# Patient Record
Sex: MALE | Race: WHITE
[De-identification: names, ages, dates, MRNs, and addresses within clinical notes are randomized per-mention and may not be internally consistent; named-entity substitution may affect disease eponyms.]

---

## 2019-10-09 ENCOUNTER — HOSPITAL ENCOUNTER (OUTPATIENT)
Dept: HOSPITAL 92 - BICRAD | Age: 34
Discharge: HOME | End: 2019-10-09
Attending: FAMILY MEDICINE
Payer: COMMERCIAL

## 2019-10-09 DIAGNOSIS — M79.672: Primary | ICD-10-CM
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
## 2019-10-11 ENCOUNTER — HOSPITAL ENCOUNTER (INPATIENT)
Dept: HOSPITAL 92 - ERS | Age: 34
LOS: 12 days | Discharge: HOME | DRG: 854 | End: 2019-10-23
Attending: INTERNAL MEDICINE | Admitting: INTERNAL MEDICINE
Payer: COMMERCIAL

## 2019-10-11 VITALS — BODY MASS INDEX: 34.7 KG/M2

## 2019-10-11 DIAGNOSIS — L03.116: ICD-10-CM

## 2019-10-11 DIAGNOSIS — R03.0: ICD-10-CM

## 2019-10-11 DIAGNOSIS — B95.62: ICD-10-CM

## 2019-10-11 DIAGNOSIS — M71.062: ICD-10-CM

## 2019-10-11 DIAGNOSIS — E66.9: ICD-10-CM

## 2019-10-11 DIAGNOSIS — E87.1: ICD-10-CM

## 2019-10-11 DIAGNOSIS — Z79.899: ICD-10-CM

## 2019-10-11 DIAGNOSIS — M72.9: ICD-10-CM

## 2019-10-11 DIAGNOSIS — F41.9: ICD-10-CM

## 2019-10-11 DIAGNOSIS — A41.02: Primary | ICD-10-CM

## 2019-10-11 LAB
ALBUMIN SERPL BCG-MCNC: 3.7 G/DL (ref 3.5–5)
ALP SERPL-CCNC: 91 U/L (ref 40–110)
ALT SERPL W P-5'-P-CCNC: 13 U/L (ref 8–55)
ANION GAP SERPL CALC-SCNC: 12 MMOL/L (ref 10–20)
AST SERPL-CCNC: 10 U/L (ref 5–34)
BILIRUB SERPL-MCNC: 1.9 MG/DL (ref 0.2–1.2)
BUN SERPL-MCNC: 14 MG/DL (ref 8.9–20.6)
CALCIUM SERPL-MCNC: 9.2 MG/DL (ref 7.8–10.44)
CHLORIDE SERPL-SCNC: 98 MMOL/L (ref 98–107)
CO2 SERPL-SCNC: 26 MMOL/L (ref 22–29)
CREAT CL PREDICTED SERPL C-G-VRATE: 0 ML/MIN (ref 70–130)
GLOBULIN SER CALC-MCNC: 3.3 G/DL (ref 2.4–3.5)
GLUCOSE SERPL-MCNC: 115 MG/DL (ref 70–105)
HGB BLD-MCNC: 15.8 G/DL (ref 14–18)
MCH RBC QN AUTO: 29.1 PG (ref 27–31)
MCV RBC AUTO: 87.4 FL (ref 78–98)
MDIFF COMPLETE?: YES
PLATELET # BLD AUTO: 209 THOU/UL (ref 130–400)
POTASSIUM SERPL-SCNC: 3.8 MMOL/L (ref 3.5–5.1)
RBC # BLD AUTO: 5.42 MILL/UL (ref 4.7–6.1)
SODIUM SERPL-SCNC: 132 MMOL/L (ref 136–145)
WBC # BLD AUTO: 24.5 THOU/UL (ref 4.8–10.8)

## 2019-10-11 PROCEDURE — 96375 TX/PRO/DX INJ NEW DRUG ADDON: CPT

## 2019-10-11 PROCEDURE — 87070 CULTURE OTHR SPECIMN AEROBIC: CPT

## 2019-10-11 PROCEDURE — 82550 ASSAY OF CK (CPK): CPT

## 2019-10-11 PROCEDURE — 83605 ASSAY OF LACTIC ACID: CPT

## 2019-10-11 PROCEDURE — 80202 ASSAY OF VANCOMYCIN: CPT

## 2019-10-11 PROCEDURE — 96361 HYDRATE IV INFUSION ADD-ON: CPT

## 2019-10-11 PROCEDURE — 85025 COMPLETE CBC W/AUTO DIFF WBC: CPT

## 2019-10-11 PROCEDURE — 96365 THER/PROPH/DIAG IV INF INIT: CPT

## 2019-10-11 PROCEDURE — 90686 IIV4 VACC NO PRSV 0.5 ML IM: CPT

## 2019-10-11 PROCEDURE — G0008 ADMIN INFLUENZA VIRUS VAC: HCPCS

## 2019-10-11 PROCEDURE — 86140 C-REACTIVE PROTEIN: CPT

## 2019-10-11 PROCEDURE — 80048 BASIC METABOLIC PNL TOTAL CA: CPT

## 2019-10-11 PROCEDURE — 90471 IMMUNIZATION ADMIN: CPT

## 2019-10-11 PROCEDURE — 96367 TX/PROPH/DG ADDL SEQ IV INF: CPT

## 2019-10-11 PROCEDURE — 87205 SMEAR GRAM STAIN: CPT

## 2019-10-11 PROCEDURE — 36415 COLL VENOUS BLD VENIPUNCTURE: CPT

## 2019-10-11 PROCEDURE — 87186 SC STD MICRODIL/AGAR DIL: CPT

## 2019-10-11 PROCEDURE — 85652 RBC SED RATE AUTOMATED: CPT

## 2019-10-11 PROCEDURE — 87077 CULTURE AEROBIC IDENTIFY: CPT

## 2019-10-11 PROCEDURE — 80053 COMPREHEN METABOLIC PANEL: CPT

## 2019-10-11 PROCEDURE — 87040 BLOOD CULTURE FOR BACTERIA: CPT

## 2019-10-11 NOTE — CT
Exam:

Left lower extremity CT scan with IV contrast:



HISTORY:

Marked soft tissue swelling of the leg around the knee. Infection cellulitis



FINDINGS:

There is very extensive superficial subcutaneous fat stranding and extensive superficial subcutaneous
 fluid evidence for extensive edema and/or cellulitis. In addition there is also very marked

diffuse superficial fasciitis circumferentially involving the leg from the visualized mid thigh throu
gh the knee region and into the visualized upper calf regions of the lower leg. There are more

focal fluid collections in the superficial infrapatellar region this could represent focal cellulitis
 and possibly extensive superficial infrapatellar bursitis. No evidence for deep intramuscular

abscess or intramuscular abnormal gas or abnormal gas within the more superficial soft tissues. No si
gnificant osseous abnormality. No abnormal knee joint effusion.



IMPRESSION:

Very extensive superficial soft tissue swelling evidence for extensive cellulitis.

Very extensive superficial fasciitis involving the lower thigh, knee, and upper calf regions.

No evidence for deep intramuscular abscess or abnormal fluid collection.

No evidence for abnormal gas within the soft tissues. No significant bony abnormality. Superficial pr
epatellar edema or cellulitis and fluid density could represent extensive bursitis as well.



Reported By: Rafal Vazquez 

Electronically Signed:  10/11/2019 6:59 PM
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
## 2019-10-11 NOTE — RAD
Left knee 4 views



HISTORY: Left knee injury.



FINDINGS: Joint spaces are preserved. No acute fracture, dislocation, or fluid distention of the supr
apatellar bursa. Prominent soft tissue swelling over the anterior aspect of the knee and extending

to the anterior portion of the lower thigh. Internal low density has the appearance of mixture of sub
cutaneous fat and edema. No soft tissue gas is apparent.











IMPRESSION: Prominent soft tissue swelling. No acute osseous abnormalities are demonstrated.



Reported By: TONG Salazar 

Electronically Signed:  10/11/2019 5:33 PM

## 2019-10-12 LAB
ANION GAP SERPL CALC-SCNC: 10 MMOL/L (ref 10–20)
BUN SERPL-MCNC: 14 MG/DL (ref 8.9–20.6)
CALCIUM SERPL-MCNC: 8.3 MG/DL (ref 7.8–10.44)
CHLORIDE SERPL-SCNC: 102 MMOL/L (ref 98–107)
CO2 SERPL-SCNC: 23 MMOL/L (ref 22–29)
CREAT CL PREDICTED SERPL C-G-VRATE: 222 ML/MIN (ref 70–130)
GLUCOSE SERPL-MCNC: 122 MG/DL (ref 70–105)
HGB BLD-MCNC: 13.6 G/DL (ref 14–18)
MCH RBC QN AUTO: 29 PG (ref 27–31)
MCV RBC AUTO: 87.7 FL (ref 78–98)
MDIFF COMPLETE?: YES
PLATELET # BLD AUTO: 190 THOU/UL (ref 130–400)
POTASSIUM SERPL-SCNC: 3.9 MMOL/L (ref 3.5–5.1)
RBC # BLD AUTO: 4.68 MILL/UL (ref 4.7–6.1)
SODIUM SERPL-SCNC: 131 MMOL/L (ref 136–145)
VANCOMYCIN TROUGH SERPL-MCNC: 10.2 UG/ML
WBC # BLD AUTO: 18.8 THOU/UL (ref 4.8–10.8)

## 2019-10-12 RX ADMIN — Medication PRN ML: at 12:45

## 2019-10-12 RX ADMIN — HYDROCODONE BITARTRATE AND ACETAMINOPHEN PRN TAB: 5; 325 TABLET ORAL at 05:14

## 2019-10-12 RX ADMIN — HYDROCODONE BITARTRATE AND ACETAMINOPHEN PRN TAB: 7.5; 325 TABLET ORAL at 22:19

## 2019-10-12 RX ADMIN — HYDROCODONE BITARTRATE AND ACETAMINOPHEN PRN TAB: 5; 325 TABLET ORAL at 11:43

## 2019-10-12 RX ADMIN — ONDANSETRON PRN MG: 2 INJECTION INTRAMUSCULAR; INTRAVENOUS at 20:32

## 2019-10-12 RX ADMIN — Medication SCH: at 21:53

## 2019-10-12 RX ADMIN — ONDANSETRON PRN MG: 2 INJECTION INTRAMUSCULAR; INTRAVENOUS at 09:20

## 2019-10-12 RX ADMIN — HYDROCODONE BITARTRATE AND ACETAMINOPHEN PRN TAB: 7.5; 325 TABLET ORAL at 17:30

## 2019-10-12 NOTE — PDOC.HOSPP
- Subjective


Encounter Date: 10/12/19


Encounter Time: 11:30


Subjective: 





pt up in bed complains of pain to his left knee. 





- Objective


Vital Signs & Weight: 


 Vital Signs (12 hours)











  Temp Pulse Resp BP Pulse Ox


 


 10/12/19 16:00  99.8 F H  108 H  20  110/68  95


 


 10/12/19 11:36  97.8 F  97  18  110/76  98


 


 10/12/19 09:25      96








 Weight











Admit Weight                   270 lb 5.92 oz


 


Weight                         270 lb 5.92 oz














I&O: 


 











 10/11/19 10/12/19 10/13/19





 06:59 06:59 06:59


 


Intake Total  1850 2290


 


Output Total   1325


 


Balance  1850 965











Result Diagrams: 


 10/12/19 06:14





 10/12/19 06:14





Hospitalist ROS





- Review of Systems


Respiratory: denies: cough, dry, shortness of breath, hemoptysis, SOB with 

excertion, pleuritic pain, sputum, wheezing, other


Cardiovascular: denies: chest pain, palpitations, orthopnea, paroxysmal noc. 

dyspnea, edema, light headedness, other


Musculoskeletal: reports: leg pain





- Medication


Medications: 


Active Medications











Generic Name Dose Route Start Last Admin





  Trade Name Freq  PRN Reason Stop Dose Admin


 


Hydrocodone Bitart/Acetaminophen  2 tab  10/12/19 12:15  10/12/19 17:30





  Norco 7.5/325  PO   2 tab





  Q4H PRN   Administration





  Moderate Pain (4-6)   





     





     





     


 


Enoxaparin Sodium  40 mg  10/12/19 09:00  10/12/19 09:16





  Lovenox  SC   40 mg





  0900 TAWNYA   Administration





     





     





     





     


 


Famotidine  20 mg  10/11/19 21:00  10/12/19 09:15





  Pepcid  PO   20 mg





  BID TAWNYA   Administration





     





     





     





     


 


Ibuprofen  400 mg  10/11/19 20:25  10/12/19 05:15





  Motrin  PO   400 mg





  Q4H PRN   Administration





  Fever > 101   





     





     





     


 


Morphine Sulfate  4 mg  10/12/19 12:15  10/12/19 19:03





  Morphine  SLOW IVP   4 mg





  Q4H PRN   Administration





  Mild-Moderate Pain (1-5)   





     





     





     


 


Ondansetron HCl  4 mg  10/11/19 20:25  10/12/19 09:20





  Zofran  IVP   4 mg





  Q6H PRN   Administration





  Nausea/Vomiting   





     





     





     


 


Saccharomyces Boulardii  250 mg  10/12/19 09:00  10/12/19 09:15





  Florastor  PO   250 mg





  DAILY TAWNYA   Administration





     





     





     





     


 


Sodium Chloride  10 ml  10/12/19 12:18  10/12/19 12:45





  Flush - Normal Saline  IVF   10 ml





  PRN PRN   Administration





  Saline Flush   





     





     





     














- Exam


Neck: negative: supple, symmetric, no JVD, no thyromegaly, no lymphadenopathy, 

no carotid bruit, JVD


Heart: negative: RRR, no murmur, no gallops, no rubs, normal peripheral pulses, 

irregular, diminshed peripheral pulses, murmur present, II/IV, III/IV


Respiratory: negative: CTAB, no wheezes, no rales, no ronchi, normal chest 

expansion, no tachypnea, normal percussion, rales, rhonchi, tachypneic, wheezes





Hosp A/P


(1) Cellulitis


Code(s): L03.90 - CELLULITIS, UNSPECIFIED   Status: Acute   





(2) Fasciitis


Code(s): M72.9 - FIBROBLASTIC DISORDER, UNSPECIFIED   Status: Acute   





- Plan





will consult ID, will continue broad spectrum abx for now. may need surgical 

debridement.
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
## 2019-10-13 LAB
HGB BLD-MCNC: 12.4 G/DL (ref 14–18)
MCH RBC QN AUTO: 29.6 PG (ref 27–31)
MCV RBC AUTO: 88.2 FL (ref 78–98)
MDIFF COMPLETE?: YES
PLATELET # BLD AUTO: 214 THOU/UL (ref 130–400)
RBC # BLD AUTO: 4.19 MILL/UL (ref 4.7–6.1)
VANCOMYCIN TROUGH SERPL-MCNC: 13.4 UG/ML
WBC # BLD AUTO: 16.1 THOU/UL (ref 4.8–10.8)

## 2019-10-13 PROCEDURE — 0M9P0ZZ DRAINAGE OF LEFT KNEE BURSA AND LIGAMENT, OPEN APPROACH: ICD-10-PCS | Performed by: ORTHOPAEDIC SURGERY

## 2019-10-13 PROCEDURE — 0MBP0ZZ EXCISION OF LEFT KNEE BURSA AND LIGAMENT, OPEN APPROACH: ICD-10-PCS | Performed by: ORTHOPAEDIC SURGERY

## 2019-10-13 RX ADMIN — HYDROCODONE BITARTRATE AND ACETAMINOPHEN PRN TAB: 10; 325 TABLET ORAL at 20:33

## 2019-10-13 RX ADMIN — HYDROCODONE BITARTRATE AND ACETAMINOPHEN PRN TAB: 7.5; 325 TABLET ORAL at 12:19

## 2019-10-13 RX ADMIN — HYDROCODONE BITARTRATE AND ACETAMINOPHEN PRN TAB: 7.5; 325 TABLET ORAL at 04:06

## 2019-10-13 RX ADMIN — Medication SCH ML: at 20:35

## 2019-10-13 RX ADMIN — Medication SCH: at 12:11

## 2019-10-13 NOTE — PDOC.HOSPP
- Subjective


Encounter Date: 10/13/19


Encounter Time: 11:45


Subjective: 





pt feels his left leg erythema is worsening. however overall he feels better. 





- Objective


Vital Signs & Weight: 


 Vital Signs (12 hours)











  Temp Pulse Resp BP Pulse Ox


 


 10/13/19 08:31  99.5 F  92  16  120/89  97








 Weight











Admit Weight                   270 lb 5.92 oz


 


Weight                         270 lb 5.92 oz














I&O: 


 











 10/12/19 10/13/19 10/14/19





 06:59 06:59 06:59


 


Intake Total 1850 4030 


 


Output Total  2900 


 


Balance 1850 1130 











Result Diagrams: 


 10/12/19 06:14





 10/12/19 06:14





Hospitalist ROS





- Review of Systems


Cardiovascular: denies: chest pain, palpitations, orthopnea, paroxysmal noc. 

dyspnea, edema, light headedness, other


Musculoskeletal: reports: other (erythema to left groin and edema)





- Medication


Medications: 


Active Medications











Generic Name Dose Route Start Last Admin





  Trade Name Freq  PRN Reason Stop Dose Admin


 


Hydrocodone Bitart/Acetaminophen  2 tab  10/12/19 12:15  10/13/19 12:19





  Norco 7.5/325  PO   2 tab





  Q4H PRN   Administration





  Moderate Pain (4-6)   





     





     





     


 


Enoxaparin Sodium  40 mg  10/12/19 09:00  10/13/19 09:18





  Lovenox  SC   40 mg





  0900 TAWNYA   Administration





     





     





     





     


 


Famotidine  20 mg  10/11/19 21:00  10/13/19 09:24





  Pepcid  PO   20 mg





  BID TAWNYA   Administration





     





     





     





     


 


Vancomycin HCl 2 gm/ Sodium  500 mls @ 250 mls/hr  10/12/19 20:00  10/13/19 04:

08





  Chloride  IVPB   500 mls





  0400,1200,2000 TAWNYA   Administration





     





     





     





     


 


Ibuprofen  400 mg  10/11/19 20:25  10/12/19 20:27





  Motrin  PO   400 mg





  Q4H PRN   Administration





  Fever > 101   





     





     





     


 


Morphine Sulfate  4 mg  10/12/19 12:15  10/12/19 19:03





  Morphine  SLOW IVP   4 mg





  Q4H PRN   Administration





  Mild-Moderate Pain (1-5)   





     





     





     


 


Ondansetron HCl  4 mg  10/11/19 20:25  10/12/19 20:32





  Zofran  IVP   4 mg





  Q6H PRN   Administration





  Nausea/Vomiting   





     





     





     


 


Saccharomyces Boulardii  250 mg  10/12/19 09:00  10/13/19 09:18





  Florastor  PO   250 mg





  DAILY TAWNYA   Administration





     





     





     





     


 


Sertraline HCl  50 mg  10/13/19 09:00  10/13/19 09:19





  Zoloft  PO   50 mg





  DAILY TAWNYA   Administration





     





     





     





     


 


Sodium Chloride  10 ml  10/12/19 21:00  10/13/19 12:11





  Flush - Normal Saline  IVF   Not Given





  Q12HR TAWNYA   





     





     





     





     


 


Sodium Chloride  10 ml  10/12/19 12:18  10/12/19 12:45





  Flush - Normal Saline  IVF   10 ml





  PRN PRN   Administration





  Saline Flush   





     





     





     














- Exam


Neck: negative: supple, symmetric, no JVD, no thyromegaly, no lymphadenopathy, 

no carotid bruit, JVD


Heart: negative: RRR, no murmur, no gallops, no rubs, normal peripheral pulses, 

irregular, diminshed peripheral pulses, murmur present, II/IV, III/IV


Respiratory: negative: CTAB, no wheezes, no rales, no ronchi, normal chest 

expansion, no tachypnea, normal percussion, rales, rhonchi, tachypneic, wheezes


Extremities: 1+ LE edema


Extremities - other findings: mild eryhtema to his left groin





Hosp A/P


(1) Cellulitis


Code(s): L03.90 - CELLULITIS, UNSPECIFIED   Status: Acute   





(2) Fasciitis


Code(s): M72.9 - FIBROBLASTIC DISORDER, UNSPECIFIED   Status: Acute   





- Plan





will consult ID, will continue broad spectrum abx for now. may need surgical 

debridement.





10/13 pt does have significant edema to his left thigh but his erythema has 

improved. I did advise him to elevate his left leg higher. He is on broad 

spectrum abx. will consider testing if his left leg swelling and erythema does 

not improve.

## 2019-10-13 NOTE — PRG
DATE OF SERVICE:  10/13/2019



SUBJECTIVE:  Still with quite a bit of pain, and redness is worsened in the

prepatellar region.  Had some difficulty urination yesterday.  No respiratory

symptoms or abdominal pain. 



OBJECTIVE:  VITAL SIGNS:  T-max 99.8.  Other vital signs are normal. 

GENERAL:  Awake, alert, oriented. 

LUNGS:  Clear. 

HEART:  S1 and S2.  Regular rate. 

EXTREMITIES:  Left leg with still persistent erythema in the prepatellar region.

Swelling. 



LABORATORY DATA:  White cell count is at 18.8, hemoglobin 13.6, platelets 190.

Creatinine 0.83.  Microbiology with MRSA. 



ASSESSMENT AND DISCUSSION:  Abscess and cellulitis at the left prepatellar area,

likely prepatellar bursitis.  We will consult Surgery. 







Job ID:  [id number]
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
## 2019-10-14 LAB
ANION GAP SERPL CALC-SCNC: 9 MMOL/L (ref 10–20)
BASOPHILS # BLD AUTO: 0 THOU/UL (ref 0–0.2)
BASOPHILS NFR BLD AUTO: 0.1 % (ref 0–1)
BUN SERPL-MCNC: 14 MG/DL (ref 8.9–20.6)
CALCIUM SERPL-MCNC: 8.5 MG/DL (ref 7.8–10.44)
CHLORIDE SERPL-SCNC: 102 MMOL/L (ref 98–107)
CO2 SERPL-SCNC: 28 MMOL/L (ref 22–29)
CREAT CL PREDICTED SERPL C-G-VRATE: 252 ML/MIN (ref 70–130)
EOSINOPHIL # BLD AUTO: 0 THOU/UL (ref 0–0.7)
EOSINOPHIL NFR BLD AUTO: 0.1 % (ref 0–10)
GLUCOSE SERPL-MCNC: 135 MG/DL (ref 70–105)
HGB BLD-MCNC: 11.9 G/DL (ref 14–18)
LYMPHOCYTES # BLD: 0.6 THOU/UL (ref 1.2–3.4)
LYMPHOCYTES NFR BLD AUTO: 3.3 % (ref 21–51)
MCH RBC QN AUTO: 29.4 PG (ref 27–31)
MCV RBC AUTO: 88.5 FL (ref 78–98)
MONOCYTES # BLD AUTO: 0.9 THOU/UL (ref 0.11–0.59)
MONOCYTES NFR BLD AUTO: 4.8 % (ref 0–10)
NEUTROPHILS # BLD AUTO: 16.3 THOU/UL (ref 1.4–6.5)
NEUTROPHILS NFR BLD AUTO: 91.7 % (ref 42–75)
PLATELET # BLD AUTO: 247 THOU/UL (ref 130–400)
POTASSIUM SERPL-SCNC: 3.7 MMOL/L (ref 3.5–5.1)
RBC # BLD AUTO: 4.05 MILL/UL (ref 4.7–6.1)
SODIUM SERPL-SCNC: 135 MMOL/L (ref 136–145)
VANCOMYCIN TROUGH SERPL-MCNC: 13.4 UG/ML
WBC # BLD AUTO: 17.7 THOU/UL (ref 4.8–10.8)

## 2019-10-14 RX ADMIN — HYDROCODONE BITARTRATE AND ACETAMINOPHEN PRN TAB: 10; 325 TABLET ORAL at 09:46

## 2019-10-14 RX ADMIN — DOCUSATE SODIUM 50 MG AND SENNOSIDES 8.6 MG SCH TAB: 8.6; 5 TABLET, FILM COATED ORAL at 21:42

## 2019-10-14 RX ADMIN — Medication SCH: at 21:42

## 2019-10-14 RX ADMIN — Medication SCH ML: at 09:50

## 2019-10-14 NOTE — PRG
DATE OF SERVICE:  10/14/2019



SUBJECTIVE:  The patient had I and D by Dr. Schneider.  200 mL of purulent exudate

retrieved from the site.  The patient has a negative pressure dressing at this time.

 He already feels improvement in the pain and swelling.  He denies any headaches.

No shortness of breath or chest pain.  No abdominal pain or diarrhea.  No

genitourinary symptoms.  No joint symptoms outside the area of involvement. 



OBJECTIVE:  VITAL SIGNS:  Normal. 

GENERAL:  The patient has negative pressure dressing in the left prepatellar region.

 The erythema has receded already. 

LUNGS:  Clear. 

HEART:  S1, S2, regular rate. 

ABDOMEN:  Soft, not distended, nontender.  No ascites.  No bladder distention. 

NEUROLOGIC:  Nonfocal.



LABORATORY DATA:  White cell count is 17.7, hemoglobin 11, platelets 247.  Sodium

135, creatinine 0.73.  Microbiology with MRSA and patient is on vancomycin. 



ASSESSMENT:  Abscess, prepatellar region, status post debridement by Dr. Schneider, has

negative pressure dressing and we will wait on final progress of the lesion before

we decide on IV versus oral antimicrobial therapy for discharge planning.  Since he

has not been documented to have been bacteremic, will be eligible for oral

antimicrobial therapy for discharge planning depending on progress. 







Job ID:  [id number]

## 2019-10-14 NOTE — PRG
DATE OF SERVICE:  10/14/2019



SUBJECTIVE:  Edwin is a 33-year-old male, who is postop day #1 from a left knee

septic prepatellar bursitis.  Apparently, he has MRSA and Dr. Schneider removed

approximately 100 mL of the patient's prepatellar bursa.  He feels a little better,

but he feels as though the erythema has advanced slightly. 



OBJECTIVE:  VITAL SIGNS:  Temperature 98.4, pulse 92, respiratory rate 16 and

nonlabored, O2 saturation 97% on room air, and blood pressure 129/72. 

GENERAL:  He is alert, appropriate, responsive with examiner. 

EXTREMITIES:  Visual inspection of the left lower extremity demonstrates him to have

erythema extending up on the medial mid thigh up towards the groin and also dorsal

mid thigh and blanches with pressure.  He has some swelling distal to his wound, but

there is no strike through.  Wound Care is placed a VAC over the bursa. 



IMPRESSION:  

1. Left knee methicillin-resistant Staphylococcus aureus septic prepatellar

bursitis, status post incision and drainage just over 12 hours. 

2. Persistent cellulitis, left thigh.



PLAN:  

1. We will add some topical lidocaine to the orders for wound care.

2. Demarcation of the erythema, so we can re-evaluate in 24 hours.

3. Continue IV antibiotics per Dr. Smith and re-evaluate tomorrow morning.  Give

consideration to reexploration if the erythema is not resolving or responding to

antibiotics. 







Job ID:  [id number]

## 2019-10-14 NOTE — OP
DATE OF PROCEDURE:  10/13/2019



PREOPERATIVE DIAGNOSIS:  Prepatellar bursitis, septic, left knee.



POSTOPERATIVE DIAGNOSIS:  Prepatellar bursitis, septic, left knee.



PROCEDURE PERFORMED:  Irrigation and debridement of left septic prepatellar bursa.



ANESTHESIA:  General.



SPECIMENS:  Culture.



DRAINS:  None.



COMPLICATIONS:  None.



BLOOD LOSS:  About 20.



DESCRIPTION OF PROCEDURE:  The patient was taken to the operating room, where

general anesthesia was induced.  The left leg was prepped and draped in usual

sterile fashion.  He was already on scheduled vancomycin preoperatively.  I made a

longitudinal incision through the abrasions and poor skin on the anterior aspect of

the knee.  This was an obvious site of entry for the infection.  A geyser of pus

erupted, probably about 200 mL of very thick pus.  It appeared to be probably an

infected hematoma of the bursa.  I then probed the bursa and broke up loculations,

debrided the tissue and excised the bursal tissue, and used pulsatile lavage

irrigation to further clean the bursa.  The bursa was then packed with a Kerlix

roll. 



POSTOPERATIVE PLAN:  He is to continue vancomycin, pending cultures and a wound VAC

will be applied by Wound Care tomorrow. 







Job ID:  [id number]

## 2019-10-15 RX ADMIN — Medication SCH: at 08:37

## 2019-10-15 RX ADMIN — DOCUSATE SODIUM 50 MG AND SENNOSIDES 8.6 MG SCH TAB: 8.6; 5 TABLET, FILM COATED ORAL at 08:26

## 2019-10-15 RX ADMIN — Medication SCH: at 20:26

## 2019-10-15 NOTE — PDOC.HOSPP
- Subjective


Encounter Date: 10/15/19


Encounter Time: 12:45


Subjective: 





Patient seen and examined for Sepsis. Pain controlled. Had BM. No new 

complaints. No overnight events





- Objective


Vital Signs & Weight: 


 Vital Signs (12 hours)











  Temp Pulse Resp BP Pulse Ox


 


 10/15/19 12:00  97.8 F  88  18  137/87  99


 


 10/15/19 08:00  98.2 F  96  18  142/84 H  99








 Weight











Admit Weight                   270 lb 5.92 oz


 


Weight                         270 lb 5.92 oz














I&O: 


 











 10/14/19 10/15/19 10/16/19





 06:59 06:59 06:59


 


Intake Total 2220 5620 360


 


Output Total 1650 3800 


 


Balance 570 1820 360











Result Diagrams: 


 10/14/19 07:58





 10/14/19 07:58





Hospitalist ROS





- Review of Systems


Respiratory: denies: cough, dry, shortness of breath, hemoptysis, SOB with 

excertion, pleuritic pain, sputum, wheezing, other


Cardiovascular: denies: chest pain, palpitations, orthopnea, paroxysmal noc. 

dyspnea, edema, light headedness, other





- Medication


Medications: 


Active Medications











Generic Name Dose Route Start Last Admin





  Trade Name Freq  PRN Reason Stop Dose Admin


 


Hydrocodone Bitart/Acetaminophen  2 tab  10/13/19 19:14  10/14/19 09:46





  [place] 10/325  PO   2 tab





  Q4H PRN   Administration





  Severe Pain (7-10)   





     





     





     


 


Enoxaparin Sodium  40 mg  10/12/19 09:00  10/15/19 08:26





  Lovenox  SC   40 mg





  0900 TAWNYA   Administration





     





     





     





     


 


Vancomycin HCl 2 gm/ Sodium  500 mls @ 250 mls/hr  10/12/19 20:00  10/15/19 12:

27





  Chloride  IVPB   500 mls





  0400,1200,2000 TAWNYA   Administration





     





     





     





     


 


Sodium Chloride  1,000 mls @ 50 mls/hr  10/15/19 07:30  10/15/19 08:27





  Normal Saline 0.9%  IV   Not Given





  .Q20H TAWNYA   





     





     





     





     


 


Ibuprofen  400 mg  10/11/19 20:25  10/12/19 20:27





  Motrin  PO   400 mg





  Q4H PRN   Administration





  Fever > 101   





     





     





     


 


Morphine Sulfate  4 mg  10/12/19 12:15  10/14/19 08:45





  Morphine  SLOW IVP   4 mg





  Q4H PRN   Administration





  Mild-Moderate Pain (1-5)   





     





     





     


 


Ondansetron HCl  4 mg  10/11/19 20:25  10/12/19 20:32





  Zofran  IVP   4 mg





  Q6H PRN   Administration





  Nausea/Vomiting   





     





     





     


 


Polyethylene Glycol  17 gm  10/14/19 11:24  10/14/19 13:29





  Miralax  PO   17 gm





  DAILY PRN   Administration





  Constipation   





     





     





     


 


Saccharomyces Boulardii  250 mg  10/12/19 09:00  10/15/19 08:26





  Florastor  PO   250 mg





  DAILY TAWNYA   Administration





     





     





     





     


 


Senna/Docusate Sodium  2 tab  10/14/19 21:00  10/15/19 08:26





  Senokot S  PO   2 tab





  BID TAWNYA   Administration





     





     





     





     


 


Sertraline HCl  50 mg  10/13/19 09:00  10/15/19 08:26





  Zoloft  PO   50 mg





  DAILY TAWNYA   Administration





     





     





     





     


 


Sodium Chloride  10 ml  10/12/19 21:00  10/15/19 08:37





  Flush - Normal Saline  IVF   Not Given





  Q12HR TAWNYA   





     





     





     





     


 


Sodium Chloride  10 ml  10/12/19 12:18  10/12/19 12:45





  Flush - Normal Saline  IVF   10 ml





  PRN PRN   Administration





  Saline Flush   





     





     





     














- Exam


General Appearance: NAD


Heart: RRR, no gallops, no rubs


Respiratory: CTAB, no wheezes, no ronchi


Gastrointestinal: soft, non-tender, non-distended, normal bowel sounds


Extremities: no edema


Extremities - other findings: wound vac +





Hosp A/P





- Plan


DVT proph w/SCDs





Sepsis due to L knee prepatellar abscess with cellulitis (MRSA +) s/p drainage


Hyponatremia


Abn LFTs ?etio


Obesity BMI 34.7


Anxiety





PLAN:


Cont IV Vancomycin


Monitor Vancomycin level


Cont wound care


Ambulate


Cont other meds


DC Lovenox - Patient ambulating


AM labs

## 2019-10-15 NOTE — PDOC.HOSPP
- Subjective


Encounter Date: 10/14/19


Encounter Time: 09:30


Subjective: 





Patient seen and examined for L knee prepatellar abscess with cellulitis. Pain 

controlled. Feeling better. No other complaints. No overnight events





- Objective


Vital Signs & Weight: 


 Vital Signs (12 hours)











  Temp Pulse Resp BP Pulse Ox


 


 10/14/19 20:00  98.5 F  99  20  126/80  94 L


 


 10/14/19 19:42      94 L








 Weight











Admit Weight                   270 lb 5.92 oz


 


Weight                         270 lb 5.92 oz














I&O: 


 











 10/14/19 10/15/19 10/16/19





 06:59 06:59 06:59


 


Intake Total 2220 5620 


 


Output Total 1650 3800 


 


Balance 570 1820 











Result Diagrams: 


 10/14/19 07:58





 10/14/19 07:58


Additional Labs: 





Microbiology





10/11/19 22:20   Knee - Wound   Bacterial Culture - Final


                              Methicillin resistant S.aureus


10/13/19 18:58   Knee   Bacterial Culture - Preliminary


10/13/19 18:58   Knee   Anaerobic Culture - Preliminary


10/11/19 17:40   Venous blood - Left Arm   Blood Culture - Preliminary


                              NO GROWTH AT 48 HOURS


10/11/19 17:31   Venous blood - Right Arm   Blood Culture - Preliminary


                              NO GROWTH AT 48 HOURS











Hospitalist ROS





- Review of Systems


Respiratory: denies: cough, dry, shortness of breath, hemoptysis, SOB with 

excertion, pleuritic pain, sputum, wheezing, other


Cardiovascular: denies: chest pain, palpitations, orthopnea, paroxysmal noc. 

dyspnea, edema, light headedness, other





- Medication


Medications: 


Active Medications











Generic Name Dose Route Start Last Admin





  Trade Name Freq  PRN Reason Stop Dose Admin


 


Hydrocodone Bitart/Acetaminophen  2 tab  10/13/19 19:14  10/14/19 09:46





  [place] 10/325  PO   2 tab





  Q4H PRN   Administration





  Severe Pain (7-10)   





     





     





     


 


Enoxaparin Sodium  40 mg  10/12/19 09:00  10/14/19 09:50





  Lovenox  SC   40 mg





  0900 TAWNYA   Administration





     





     





     





     


 


Famotidine  20 mg  10/11/19 21:00  10/14/19 21:42





  Pepcid  PO   20 mg





  BID TAWNYA   Administration





     





     





     





     


 


Vancomycin HCl 2 gm/ Sodium  500 mls @ 250 mls/hr  10/12/19 20:00  10/15/19 04:

58





  Chloride  IVPB   500 mls





  0400,1200,2000 TAWNYA   Administration





     





     





     





     


 


Sodium Chloride  1,000 mls @ 100 mls/hr  10/13/19 13:45  10/15/19 04:58





  Normal Saline 0.9%  IV   1,000 mls





  .Q10H TAWNYA   Administration





     





     





     





     


 


Ibuprofen  400 mg  10/11/19 20:25  10/12/19 20:27





  Motrin  PO   400 mg





  Q4H PRN   Administration





  Fever > 101   





     





     





     


 


Morphine Sulfate  4 mg  10/12/19 12:15  10/14/19 08:45





  Morphine  SLOW IVP   4 mg





  Q4H PRN   Administration





  Mild-Moderate Pain (1-5)   





     





     





     


 


Ondansetron HCl  4 mg  10/11/19 20:25  10/12/19 20:32





  Zofran  IVP   4 mg





  Q6H PRN   Administration





  Nausea/Vomiting   





     





     





     


 


Polyethylene Glycol  17 gm  10/14/19 11:24  10/14/19 13:29





  Miralax  PO   17 gm





  DAILY PRN   Administration





  Constipation   





     





     





     


 


Saccharomyces Boulardii  250 mg  10/12/19 09:00  10/14/19 09:49





  Florastor  PO   250 mg





  DAILY TAWNYA   Administration





     





     





     





     


 


Senna/Docusate Sodium  2 tab  10/14/19 21:00  10/14/19 21:42





  Senokot S  PO   2 tab





  BID TAWNYA   Administration





     





     





     





     


 


Sertraline HCl  50 mg  10/13/19 09:00  10/14/19 09:49





  Zoloft  PO   50 mg





  DAILY TAWNYA   Administration





     





     





     





     


 


Sodium Chloride  10 ml  10/12/19 21:00  10/14/19 21:42





  Flush - Normal Saline  IVF   Not Given





  Q12HR TAWNYA   





     





     





     





     


 


Sodium Chloride  10 ml  10/12/19 12:18  10/12/19 12:45





  Flush - Normal Saline  IVF   10 ml





  PRN PRN   Administration





  Saline Flush   





     





     





     














- Exam


General Appearance: NAD


Heart: RRR, no gallops


Respiratory: CTAB, no rales


Gastrointestinal: soft, non-distended


Extremities: no edema





Hosp A/P





- Plan





Sepsis due to L knee prepatellar abscess with cellulitis (MRSA +)


Hyponatremia


Abn LFTs ?etio


Obesity BMI 34.7


Anxiety





PLAN:


Cont IV Vancomycin


Cont wound care


Ambulate


Cont other meds


Labs Q48hr

## 2019-10-15 NOTE — PRG
DATE OF SERVICE:  10/15/2019



SUBJECTIVE:  Feeling better.  Less pain in left lower extremity.  No respiratory

symptoms, abdominal pain, or diarrhea.  No genitourinary symptoms. 



OBJECTIVE:  VITAL SIGNS:  Normal. 

LUNGS:  Clear. 

HEART:  S1 and S2, regular rate. 

ABDOMEN:  Soft and not distended. 

EXTREMITIES:  Left thigh is still swollen with edema, but less than before.  Not

much erythema noted anymore.  Negative pressure dressing in place. 



LABORATORY DATA:  White cell count 17.7, hemoglobin 11, platelets 247; this is from

yesterday.  Microbiology with MRSA.  The organism is susceptible to clindamycin,

tetracycline, and rifampin. 



ASSESSMENT AND DISCUSSION:  Prepatellar abscess, status post incision and drainage,

negative blood cultures.  Plan discharge on clindamycin 300 mg four times daily for

probably another 10 days approximately.  Follow up in the clinic. 







Job ID:  [id number]

## 2019-10-16 LAB
ALBUMIN SERPL BCG-MCNC: 2.9 G/DL (ref 3.5–5)
ALP SERPL-CCNC: 135 U/L (ref 40–110)
ALT SERPL W P-5'-P-CCNC: 60 U/L (ref 8–55)
ANION GAP SERPL CALC-SCNC: 12 MMOL/L (ref 10–20)
AST SERPL-CCNC: 41 U/L (ref 5–34)
BILIRUB SERPL-MCNC: 0.5 MG/DL (ref 0.2–1.2)
BUN SERPL-MCNC: 9 MG/DL (ref 8.9–20.6)
CALCIUM SERPL-MCNC: 8.5 MG/DL (ref 7.8–10.44)
CHLORIDE SERPL-SCNC: 104 MMOL/L (ref 98–107)
CO2 SERPL-SCNC: 25 MMOL/L (ref 22–29)
CREAT CL PREDICTED SERPL C-G-VRATE: 259 ML/MIN (ref 70–130)
GLOBULIN SER CALC-MCNC: 3 G/DL (ref 2.4–3.5)
GLUCOSE SERPL-MCNC: 94 MG/DL (ref 70–105)
HGB BLD-MCNC: 11.6 G/DL (ref 14–18)
MCH RBC QN AUTO: 28.8 PG (ref 27–31)
MCV RBC AUTO: 88 FL (ref 78–98)
MDIFF COMPLETE?: YES
PLATELET # BLD AUTO: 329 THOU/UL (ref 130–400)
POTASSIUM SERPL-SCNC: 3.7 MMOL/L (ref 3.5–5.1)
RBC # BLD AUTO: 4.04 MILL/UL (ref 4.7–6.1)
SODIUM SERPL-SCNC: 137 MMOL/L (ref 136–145)
VANCOMYCIN TROUGH SERPL-MCNC: 13.9 UG/ML
WBC # BLD AUTO: 14.4 THOU/UL (ref 4.8–10.8)

## 2019-10-16 RX ADMIN — Medication SCH ML: at 08:26

## 2019-10-16 RX ADMIN — Medication SCH ML: at 20:33

## 2019-10-16 NOTE — PDOC.HOSPP
- Subjective


Encounter Date: 10/16/19


Encounter Time: 12:00


Subjective: 





Patient seen and examined for bursitis. Pain controlled. No new complaints. No 

overnight events





- Objective


Vital Signs & Weight: 


 Vital Signs (12 hours)











  Temp Pulse Resp BP Pulse Ox


 


 10/16/19 20:00  98.2 F  76  16  145/93 H  97








 Weight











Admit Weight                   270 lb 5.92 oz


 


Weight                         270 lb 5.92 oz














I&O: 


 











 10/15/19 10/16/19 10/17/19





 06:59 06:59 06:59


 


Intake Total 5620 3230 


 


Output Total 3800 1100 


 


Balance 1820 2130 











Result Diagrams: 


 10/16/19 05:33





 10/16/19 05:33





Hospitalist ROS





- Review of Systems


Cardiovascular: denies: chest pain, palpitations, orthopnea, paroxysmal noc. 

dyspnea, edema, light headedness, other


Gastrointestinal: denies: nausea, vomiting, abdominal pain, diarrhea, 

constipation, melena, hematochezia, other





- Medication


Medications: 


Active Medications











Generic Name Dose Route Start Last Admin





  Trade Name Freq  PRN Reason Stop Dose Admin


 


Acetaminophen  650 mg  10/11/19 20:25  10/16/19 20:34





  Tylenol  PO   650 mg





  Q4H PRN   Administration





  Headache/Fever/Mild Pain (1-3)   





     





     





     


 


Hydrocodone Bitart/Acetaminophen  2 tab  10/13/19 19:14  10/14/19 09:46





  [place] 10/325  PO   2 tab





  Q4H PRN   Administration





  Severe Pain (7-10)   





     





     





     


 


Vancomycin HCl 2 gm/ Sodium  500 mls @ 250 mls/hr  10/12/19 20:00  10/16/19 20:

32





  Chloride  IVPB   500 mls





  0400,1200,2000 TAWNYA   Administration





     





     





     





     


 


Ibuprofen  400 mg  10/11/19 20:25  10/12/19 20:27





  Motrin  PO   400 mg





  Q4H PRN   Administration





  Fever > 101   





     





     





     


 


Morphine Sulfate  4 mg  10/12/19 12:15  10/16/19 11:53





  Morphine  SLOW IVP   4 mg





  Q4H PRN   Administration





  Mild-Moderate Pain (1-5)   





     





     





     


 


Ondansetron HCl  4 mg  10/11/19 20:25  10/12/19 20:32





  Zofran  IVP   4 mg





  Q6H PRN   Administration





  Nausea/Vomiting   





     





     





     


 


Polyethylene Glycol  17 gm  10/14/19 11:24  10/14/19 13:29





  Miralax  PO   17 gm





  DAILY PRN   Administration





  Constipation   





     





     





     


 


Saccharomyces Boulardii  250 mg  10/12/19 09:00  10/16/19 08:24





  Florastor  PO   250 mg





  DAILY TAWNYA   Administration





     





     





     





     


 


Sertraline HCl  50 mg  10/13/19 09:00  10/16/19 08:24





  Zoloft  PO   50 mg





  DAILY TAWNYA   Administration





     





     





     





     


 


Sodium Chloride  10 ml  10/12/19 21:00  10/16/19 20:33





  Flush - Normal Saline  IVF   10 ml





  Q12HR TAWNYA   Administration





     





     





     





     


 


Sodium Chloride  10 ml  10/12/19 12:18  10/12/19 12:45





  Flush - Normal Saline  IVF   10 ml





  PRN PRN   Administration





  Saline Flush   





     





     





     














- Exam


General Appearance: NAD


Extremities: no edema


Extremities - other findings: wound vac+


Psychiatric: normal affect, A&O x 3





Hosp A/P





- Plan


DVT proph w/SCDs





Sepsis due to L knee prepatellar abscess with cellulitis (MRSA +) s/p drainage


Hyponatremia


Abn LFTs ?etio


Obesity BMI 34.7


Anxiety





PLAN:


Cont IV Vancomycin with Vancomycin level monitoring


Cont wound care/wound vac


Cont other meds


AM labs

## 2019-10-16 NOTE — PRG
DATE OF SERVICE:  10/16/2019



SUBJECTIVE:  Edwin is a 33-year-old male, who is postoperative day 3 from a left

knee prepatellar septic bursitis irrigation and debridement.  He is slowly, but

surely improving. 



OBJECTIVE:  VITAL SIGNS:  Temperature 97.4, pulse 80, respiratory rate 20, blood

pressure 127/76. 

GENERAL:  He is alert and oriented to person, place, time, and situation. 

EXTREMITIES:  Visual inspection of the left lower extremity demonstrates him to have

some edema around the buttocks with some mild erythema, but it is blanchable and is

nontender.  The erythema of the left thigh is receding quite nicely, as is the edema

and swelling of the left leg.  His wound VAC has been changed. 



LABORATORY DATA:  White blood cell count is 14.4.  Hematology demonstrated

methicillin-resistant Staphylococcus aureus. 



IMPRESSION:  Postoperative day 3, incision, drainage, and washout, left knee

prepatellar septic bursitis and cellulitis secondary to methicillin-resistant

Staphylococcus aureus. 



PLAN:  Antibiotic treatment per Dr. Smith.  Continue wound VAC.  No indication for

repeat I and D at this point since he is slowly improving.  However, I did advise

him that getting up, moving around, and spending more time out of bed would help

with his edema in the left buttock.  We will recheck tomorrow. 







Job ID:  [id number]

## 2019-10-16 NOTE — PRG
DATE OF SERVICE:  10/16/2019



SUBJECTIVE:  Still slow improvement, but steady.  No respiratory symptoms.  Appetite

is improved.  No abdominal pain.  No diarrhea. 



OBJECTIVE:  VITAL SIGNS:  Temperature normal.  Other vital signs are within normal

limits. 

GENERAL:  Awake, alert, and oriented. 

LUNGS:  Clear. 

HEART:  S1 and S2, regular rate. 

ABDOMEN:  Soft.  Not distended or tender.  No ascites.  No bladder distention. 

EXTREMITIES:  Left prepatellar skin region is markedly improved.  Still quite a bit

of edema around the negative pressure dressing site. 



LABORATORY DATA:  White cell count 14.4, hemoglobin 11.6, platelets 229 with a

creatinine 0.71.  AST 41, ALT 60, and alkaline phosphatase 135.  MRSA retrieved from

culture. 



ASSESSMENT AND DISCUSSION:  Prepatellar bursitis, status post I and D with slow

improvement.  Continue vancomycin IV.  Maybe another day and be able to go home on

oral clindamycin. 







Job ID:  [id number]

## 2019-10-17 LAB
HGB BLD-MCNC: 12.5 G/DL (ref 14–18)
MCH RBC QN AUTO: 29.4 PG (ref 27–31)
MCV RBC AUTO: 87.8 FL (ref 78–98)
MDIFF COMPLETE?: YES
PLATELET # BLD AUTO: 389 THOU/UL (ref 130–400)
RBC # BLD AUTO: 4.25 MILL/UL (ref 4.7–6.1)
VANCOMYCIN TROUGH SERPL-MCNC: 18.5 UG/ML
WBC # BLD AUTO: 15.6 THOU/UL (ref 4.8–10.8)

## 2019-10-17 RX ADMIN — Medication SCH ML: at 09:14

## 2019-10-17 RX ADMIN — Medication SCH ML: at 20:02

## 2019-10-17 NOTE — PRG
DATE OF SERVICE:  10/17/2019



SUBJECTIVE:  The patient is seen and examined at bedside.  He has some complaints

about diarrhea x2, watery.  No abdominal pain.  Some discomfort in the left knee

area, but otherwise he is doing good.  His appetite is fair. 



OBJECTIVE:  VITAL SIGNS:  Blood pressure is 154/93, pulse is 76, temperature is

97.6, maximal temperature is 98.5, respiratory rate is 18, and O2 saturation is 96%

on room air. 

HEENT:  His head is atraumatic and normocephalic.  Eyes are PERRLA.  Sclerae are

nonicteric.  Oral mucosa is moist. 

NECK:  Supple. 

LUNGS:  Clear. 

HEART:  S1 and S2, normal. 

ABDOMEN:  Soft, nontender, and nondistended. 

EXTREMITIES:  Left knee is wrapped.  There is some 1+ peripheral edema around the

left knee area.  The wound VAC is in place. 

NEUROLOGICAL:  He is alert and oriented x4.  There is no any motor or sensory

deficits. 



LABORATORY DATA:  White count of 15.6, hemoglobin of 12.5, hematocrit 37.3, and

platelet count is 389,000. 



IMPRESSION:  

1. Sepsis due to left knee prepatellar abscess with cellulitis, positive

methicillin-resistant Staphylococcus aureus, status post drainage. 

2. Hyponatremia.

3. Anxiety.

4. Obesity.

5. Elevated LFTs of unclear etiology most likely related to his obesity.



PLAN:  Since his white count is still not trending down, I recommend to continue his

IV antibiotics in the hospital and check his stool for C. difficile toxins and

antigen to rule out Clostridium difficile colitis and continue probiotics.  Continue

wound care. 





Job ID:  [id number]

## 2019-10-18 LAB
HGB BLD-MCNC: 13.2 G/DL (ref 14–18)
MCH RBC QN AUTO: 29 PG (ref 27–31)
MCV RBC AUTO: 87.6 FL (ref 78–98)
MDIFF COMPLETE?: YES
PLATELET # BLD AUTO: 429 THOU/UL (ref 130–400)
RBC # BLD AUTO: 4.54 MILL/UL (ref 4.7–6.1)
WBC # BLD AUTO: 15.7 THOU/UL (ref 4.8–10.8)

## 2019-10-18 RX ADMIN — Medication SCH ML: at 08:12

## 2019-10-18 RX ADMIN — Medication SCH: at 19:42

## 2019-10-18 NOTE — MRI
LEFT LOWER EXTREMITY MRI WITH AND WITHOUT IV CONTRAST;

10/18/19

 

HISTORY: 

Infection, prior abscess drainage five days ago with continued elevated white blood cell count, dalton
rn for abscess. 

 

Multiplanar, multisequence MRI examination of the lower extremity is performed. Open wound is noted i
n the superficial soft tissues medial to the patellar tendon and overlying the medial patellar retina
cular region. There are some persistent small very irregularly shaped fluid collections noted in the 
subcutaneous tissue including some irregular anterior superficial prepatellar and superficial infrapa
tellar fluid collections noted up to 1.4 cm in AP dimension and 4.8 cm transversely. There are also s
ome multilobulated very small poorly defined fluid collections in the lateral subcutaneous soft tissu
es overlying the lateral patellar retinacular region. 

 

IMPRESSION: 

Very extensive persistent subcutaneous edema and cellulitis with multiple small poorly defined fluid 
collections noted anteriorly over the superficial prepatellar and infrapatellar regions as well as la
terally overlying the lateral patellar retinacular region. Open surgical wound overlying the medial a
nterior patellar retinacular region. There is evidence for some peripheral persistent myositis involv
ing the vastus medialis and vastus lateralis muscles as well as superficial fasciitis. 

 

POS: [facility]

## 2019-10-18 NOTE — PRG
DATE OF SERVICE:  10/18/2019



SUBJECTIVE:  The patient is seen and examined at the bedside.  He does not have much

complaints to offer.  His dressing was just changed by the wound care team. 



OBJECTIVE:  VITAL SIGNS:  Blood pressure is 147/93, pulse is 67, respiratory rate is

18, O2 saturation is 97% on room air, and temperature is 97.8.  Temperature maximal

is 98.5. 

HEENT:  His head is atraumatic, normocephalic.  Eyes are PERRLA.  Sclerae are

nonicteric.  Oral mucosa is moist. 

NECK:  Supple. 

LUNGS:  Clear. 

HEART:  S1, S2 normal. 

ABDOMEN:  Soft, nontender, nondistended. 

EXTREMITIES:  Left knee area with wound VAC dressing. 

NEUROLOGICAL:  He is alert and oriented x4.  There are no any motor deficits.



LABORATORY DATA:  Labs showed white count of 15.7, hemoglobin of 13.2, platelet

count is 429,000, and hematocrit is 39.8.  Vancomycin trough is 18.5.  Microbiology,

no new findings. 



IMPRESSION:  

1. Sepsis due to left knee prepatellar abscess with cellulitis positive for

methicillin-resistant Staphylococcus aureus, status post drainage. 

2. Hyponatremia, improved.

3. Anxiety, chronic, stable.

4. Obesity, chronic, stable.

5. Elevated LFTs, most likely related to his obesity __________ secondary to sepsis.



DISCUSSION:  Case was discussed with Dr. Smith, who recommends to obtain MRI of the

left lower extremity to rule out any pus collection since his white count is still

up to 16,000 for the last few days.  For now, we will continue his vancomycin and

p.r.n. pain medications. 





Job ID:  [id number]

## 2019-10-19 LAB
HGB BLD-MCNC: 13.1 G/DL (ref 14–18)
MCH RBC QN AUTO: 28.9 PG (ref 27–31)
MCV RBC AUTO: 87.1 FL (ref 78–98)
MDIFF COMPLETE?: YES
PLATELET # BLD AUTO: 434 THOU/UL (ref 130–400)
RBC # BLD AUTO: 4.55 MILL/UL (ref 4.7–6.1)
VANCOMYCIN TROUGH SERPL-MCNC: 15.9 UG/ML
WBC # BLD AUTO: 14.9 THOU/UL (ref 4.8–10.8)

## 2019-10-19 RX ADMIN — Medication SCH ML: at 20:47

## 2019-10-19 RX ADMIN — Medication SCH ML: at 08:41

## 2019-10-19 NOTE — PRG
DATE OF SERVICE:  10/19/2019



SUBJECTIVE:  The patient is seen and examined at the bedside.  He does not have much

complaints to offer except for some pain he had before in this left knee area and

left thigh.  His appetite is good. 



OBJECTIVE:  VITAL SIGNS:  Blood pressure is 149/83, pulse is 77, respiratory rate is

18, O2 saturation is 98%, temperature is 98.3, and maximal temperature is 98.5. 

HEENT:  His head is atraumatic and normocephalic.  Eyes are PERRLA.  Sclerae are

nonicteric.  Oral mucosa is moist. 

NECK:  Supple. 

LUNGS:  Clear. 

HEART:  S1 and S2 normal.  No S3.  No S4.  No any murmur. 

ABDOMEN:  Soft, nontender, and nondistended. 

EXTREMITIES:  Left knee area is wrapped.  The skin is somewhat swollen. 

NEUROLOGICAL:  He moves his all 4 extremities.  There are no any motor deficits.



DIAGNOSTIC DATA:  Labs showed a white count of 14.9, hemoglobin 13.1, platelet count

434, and hematocrit is 39.7.  MRI of the left lower extremity done yesterday showed

very extensive persistent subcutaneous edema and cellulitis with multiple small

poorly-defined fluid collections noted anteriorly over the superficial prepatellar

and infrapatellar regions as well as laterally overlying the lateral patellar

retinacular region with some evidence for some peripheral persistent myositis

involving the vastus medialis and vastus lateralis muscles as well as superficial

fasciitis. 



IMPRESSION:  

1. Sepsis due to left knee prepatellar abscess with cellulitis positive for

methicillin-resistant Staphylococcus aureus, status post drainage and recent MRI

showed some additional areas of concern, so Ortho is reconsulted and the patient

will be taken to the OR for further drainage and cleaning. 

2. Hyponatremia, improved.

3. Anxiety, chronic, stable.

4. Obesity, chronic, stable.

5. Elevated LFTs, most likely related to his infection.



PLAN:  As I mentioned above, reconsulted with Ortho.  Continue vancomycin treatment.

 Continue probiotic.  Continue pain management with fentanyl, hydrocodone, and

morphine p.r.n. as needed. 







Job ID:  [id number]

## 2019-10-19 NOTE — PRG
DATE OF SERVICE:  10/19/2019



SUBJECTIVE:  Mr. Hull yesterday had an MRI and after discussions that I had 
with Dr. Cole and MRI showed some areas of still remaining inflammatory process 
with

likely purulence, he went back to the OR and had more areas of debridement in 
the

prepatellar region.  He is back in the room.  Now, he is feeling quite well, 
little

bit disappointed with the protracted nature of this admission.  No headaches.  
No

visual symptoms.  No dyspnea, cough, or sputum production.  No chest pain.  No

abdominal pain.  Left leg with less inflammation and less swelling. 



OBJECTIVE:  VITAL SIGNS:  Temperature is within normal limits. 

LUNGS:  Clear. 

HEART:  S1 and S2.  Regular rate. 

EXTREMITIES:  Left leg with the dressing, which was not removed.



LABORATORY DATA:  White cell count is down to 14.9; hemoglobin 13;

platelets are at 434.  Creatinine 0.71.  AST 41, ALT 60, alkaline phosphatase 
135.

He is currently on vancomycin and his last trough level was 18.5. 



ASSESSMENT AND DISCUSSION:  Prepatellar bursitis, extensive with residual areas 
of

inflammatory process with purulence, status post a second I and D.  Hopefully, 
now

we are going to see a more rapid improvement to allow discharge planning 
possibly

for Monday on oral regimen. 







Job ID:  [id number]



[facility]

## 2019-10-19 NOTE — PRG
DATE OF SERVICE:  10/19/2019



SUBJECTIVE:  Edwin is a 33-year-old male, who is postop day 7 from incision,

drainage, and washout of the left knee for a significant prepatellar septic

bursitis.  His white count has failed to appreciably improve, and today, it is still

at 14.9.  His original presentation was 16.1.  He has had sequential wound VACs and

dressing changes, and his erythema has resolved, and his pain is better, and his

function has improved.  However, there are still some concerns about the white count

remaining elevated. 



PHYSICAL EXAMINATION:

His knee still feels woody and still feels tense and firm to me.  Range of motion is

adequate and is nonprovocative and does not appear to be peritoneal.  His swelling

overall has improved, especially at the buttocks and lower leg, but the knee

pericapsular region is still quite firm and tender. 



IMPRESSION:  

1. Persistent prepatellar bursitis.

2. Leukocytosis.



PLAN:  In face of clinical examination and persistent white blood cell count, I

think it would be most appropriate to take him back to surgery for reexploration.  I

have contacted Dr. Castañeda, who is here today and will be happy to perform the

procedure, and I have also discussed the patient with Dr. Schneider via telephone, and

we will proceed with reexploration today. 







Job ID:  [id number]

## 2019-10-20 RX ADMIN — Medication SCH ML: at 20:10

## 2019-10-20 RX ADMIN — HYDROCODONE BITARTRATE AND ACETAMINOPHEN PRN TAB: 10; 325 TABLET ORAL at 22:13

## 2019-10-20 RX ADMIN — HYDROCODONE BITARTRATE AND ACETAMINOPHEN PRN TAB: 10; 325 TABLET ORAL at 17:43

## 2019-10-20 RX ADMIN — Medication SCH ML: at 08:40
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
## 2019-10-21 LAB
ALBUMIN SERPL BCG-MCNC: 3.4 G/DL (ref 3.5–5)
ALP SERPL-CCNC: 122 U/L (ref 40–110)
ALT SERPL W P-5'-P-CCNC: 122 U/L (ref 8–55)
ANION GAP SERPL CALC-SCNC: 12 MMOL/L (ref 10–20)
AST SERPL-CCNC: 49 U/L (ref 5–34)
BASOPHILS # BLD AUTO: 0.1 THOU/UL (ref 0–0.2)
BASOPHILS NFR BLD AUTO: 0.7 % (ref 0–1)
BILIRUB SERPL-MCNC: 0.3 MG/DL (ref 0.2–1.2)
BUN SERPL-MCNC: 15 MG/DL (ref 8.9–20.6)
CALCIUM SERPL-MCNC: 8.8 MG/DL (ref 7.8–10.44)
CHLORIDE SERPL-SCNC: 103 MMOL/L (ref 98–107)
CO2 SERPL-SCNC: 24 MMOL/L (ref 22–29)
CREAT CL PREDICTED SERPL C-G-VRATE: 245 ML/MIN (ref 70–130)
EOSINOPHIL # BLD AUTO: 0.9 THOU/UL (ref 0–0.7)
EOSINOPHIL NFR BLD AUTO: 7.6 % (ref 0–10)
GLOBULIN SER CALC-MCNC: 3.7 G/DL (ref 2.4–3.5)
GLUCOSE SERPL-MCNC: 89 MG/DL (ref 70–105)
HGB BLD-MCNC: 12.6 G/DL (ref 14–18)
LYMPHOCYTES # BLD: 2.6 THOU/UL (ref 1.2–3.4)
LYMPHOCYTES NFR BLD AUTO: 22.3 % (ref 21–51)
MCH RBC QN AUTO: 28.1 PG (ref 27–31)
MCV RBC AUTO: 87.7 FL (ref 78–98)
MONOCYTES # BLD AUTO: 0.9 THOU/UL (ref 0.11–0.59)
MONOCYTES NFR BLD AUTO: 7.9 % (ref 0–10)
NEUTROPHILS # BLD AUTO: 7.2 THOU/UL (ref 1.4–6.5)
NEUTROPHILS NFR BLD AUTO: 61.6 % (ref 42–75)
PLATELET # BLD AUTO: 405 THOU/UL (ref 130–400)
POTASSIUM SERPL-SCNC: 4 MMOL/L (ref 3.5–5.1)
RBC # BLD AUTO: 4.47 MILL/UL (ref 4.7–6.1)
SODIUM SERPL-SCNC: 135 MMOL/L (ref 136–145)
WBC # BLD AUTO: 11.7 THOU/UL (ref 4.8–10.8)

## 2019-10-21 RX ADMIN — Medication PRN ML: at 16:56

## 2019-10-21 RX ADMIN — HYDROCODONE BITARTRATE AND ACETAMINOPHEN PRN TAB: 10; 325 TABLET ORAL at 04:25

## 2019-10-21 RX ADMIN — Medication PRN ML: at 11:54

## 2019-10-21 RX ADMIN — HYDROCODONE BITARTRATE AND ACETAMINOPHEN PRN TAB: 10; 325 TABLET ORAL at 16:14

## 2019-10-21 RX ADMIN — Medication SCH ML: at 08:36

## 2019-10-21 RX ADMIN — Medication SCH ML: at 20:06

## 2019-10-21 RX ADMIN — HYDROCODONE BITARTRATE AND ACETAMINOPHEN PRN TAB: 10; 325 TABLET ORAL at 20:05

## 2019-10-21 RX ADMIN — HYDROCODONE BITARTRATE AND ACETAMINOPHEN PRN TAB: 10; 325 TABLET ORAL at 12:05
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
## 2019-10-22 LAB
ALBUMIN SERPL BCG-MCNC: 3.3 G/DL (ref 3.5–5)
ALP SERPL-CCNC: 113 U/L (ref 40–110)
ALT SERPL W P-5'-P-CCNC: 101 U/L (ref 8–55)
ANION GAP SERPL CALC-SCNC: 12 MMOL/L (ref 10–20)
AST SERPL-CCNC: 37 U/L (ref 5–34)
BILIRUB SERPL-MCNC: 0.3 MG/DL (ref 0.2–1.2)
BUN SERPL-MCNC: 13 MG/DL (ref 8.9–20.6)
CALCIUM SERPL-MCNC: 9 MG/DL (ref 7.8–10.44)
CHLORIDE SERPL-SCNC: 103 MMOL/L (ref 98–107)
CO2 SERPL-SCNC: 24 MMOL/L (ref 22–29)
CREAT CL PREDICTED SERPL C-G-VRATE: 239 ML/MIN (ref 70–130)
GLOBULIN SER CALC-MCNC: 3.6 G/DL (ref 2.4–3.5)
GLUCOSE SERPL-MCNC: 96 MG/DL (ref 70–105)
HGB BLD-MCNC: 12.8 G/DL (ref 14–18)
MCH RBC QN AUTO: 28.9 PG (ref 27–31)
MCV RBC AUTO: 87.6 FL (ref 78–98)
MDIFF COMPLETE?: YES
PLATELET # BLD AUTO: 360 THOU/UL (ref 130–400)
POTASSIUM SERPL-SCNC: 4.1 MMOL/L (ref 3.5–5.1)
RBC # BLD AUTO: 4.43 MILL/UL (ref 4.7–6.1)
SODIUM SERPL-SCNC: 135 MMOL/L (ref 136–145)
WBC # BLD AUTO: 13.1 THOU/UL (ref 4.8–10.8)

## 2019-10-22 RX ADMIN — HYDROCODONE BITARTRATE AND ACETAMINOPHEN PRN TAB: 10; 325 TABLET ORAL at 04:40

## 2019-10-22 RX ADMIN — HYDROCODONE BITARTRATE AND ACETAMINOPHEN PRN TAB: 10; 325 TABLET ORAL at 12:21

## 2019-10-22 RX ADMIN — Medication PRN ML: at 12:27

## 2019-10-22 RX ADMIN — Medication SCH ML: at 20:04

## 2019-10-22 RX ADMIN — HYDROCODONE BITARTRATE AND ACETAMINOPHEN PRN TAB: 10; 325 TABLET ORAL at 17:53

## 2019-10-22 RX ADMIN — Medication SCH ML: at 08:07

## 2019-10-22 RX ADMIN — HYDROCODONE BITARTRATE AND ACETAMINOPHEN PRN TAB: 10; 325 TABLET ORAL at 23:49

## 2019-10-22 NOTE — PRG
DATE OF SERVICE:  10/22/2019



SUBJECTIVE:  The patient is feeling better.  No headaches.  No shortness of breath

or abdominal pain.  Voiding without difficulty.  No diarrhea. 



OBJECTIVE:  VITAL SIGNS:  He has been afebrile.  Other vital signs are essentially

normal. 

LUNGS:  Clear. 

HEART:  S1 and S2 regular rate. 

ABDOMEN:  Soft, not distended. 

EXTREMITIES:  Right thigh, much suppler.  Not tender at all.  No erythema.  Negative

pressure dressing in the prepatellar region, and the calf is really nice, not

swollen or erythematous. 



LABORATORY DATA:  AST 37, .  White cell count 13.1, hemoglobin 12, platelets

360. 



ASSESSMENT AND DISCUSSION:  Prepatellar bursitis, extensive, residual areas of

inflammatory process, status post second incision and drainage, now I think he is

ready to go home.  Discharge planning on oral doxycycline and rifampin for 2 weeks.

Continue wound care management. 







Job ID:  [id number]
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
## 2019-10-23 VITALS — SYSTOLIC BLOOD PRESSURE: 137 MMHG | DIASTOLIC BLOOD PRESSURE: 87 MMHG | TEMPERATURE: 97.9 F

## 2019-10-23 LAB
ALBUMIN SERPL BCG-MCNC: 3.3 G/DL (ref 3.5–5)
ALP SERPL-CCNC: 113 U/L (ref 40–110)
ALT SERPL W P-5'-P-CCNC: 84 U/L (ref 8–55)
ANION GAP SERPL CALC-SCNC: 11 MMOL/L (ref 10–20)
AST SERPL-CCNC: 28 U/L (ref 5–34)
BILIRUB SERPL-MCNC: 0.3 MG/DL (ref 0.2–1.2)
BUN SERPL-MCNC: 6 MG/DL (ref 8.9–20.6)
CALCIUM SERPL-MCNC: 9 MG/DL (ref 7.8–10.44)
CHLORIDE SERPL-SCNC: 103 MMOL/L (ref 98–107)
CO2 SERPL-SCNC: 28 MMOL/L (ref 22–29)
CREAT CL PREDICTED SERPL C-G-VRATE: 198 ML/MIN (ref 70–130)
GLOBULIN SER CALC-MCNC: 3.6 G/DL (ref 2.4–3.5)
GLUCOSE SERPL-MCNC: 101 MG/DL (ref 70–105)
POTASSIUM SERPL-SCNC: 4.1 MMOL/L (ref 3.5–5.1)
SODIUM SERPL-SCNC: 138 MMOL/L (ref 136–145)

## 2019-10-23 RX ADMIN — HYDROCODONE BITARTRATE AND ACETAMINOPHEN PRN TAB: 10; 325 TABLET ORAL at 04:49

## 2019-10-23 RX ADMIN — Medication SCH ML: at 08:25

## 2019-10-23 RX ADMIN — HYDROCODONE BITARTRATE AND ACETAMINOPHEN PRN TAB: 10; 325 TABLET ORAL at 08:25
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
## 2019-10-24 NOTE — OP
DATE OF PROCEDURE:  10/19/2019



PREOPERATIVE DIAGNOSIS:  Septic prepatellar bursa, left knee.



POSTOPERATIVE DIAGNOSIS:  Septic prepatellar bursa, left knee.



PROCEDURE PERFORMED:  Irrigation and debridement of left prepatellar bursa.



ANESTHESIA:  General.



TOURNIQUET TIME:  Zero.



ESTIMATED BLOOD LOSS:  20 mL.



DRAINS:  None.



SPECIMENS:  None.



COMPLICATIONS:  None.



OUTCOME:  Decompressed prepatellar abscess.



INDICATIONS:  Mr. Hull is a pleasant 33-year-old gentleman, status post incision and

drainage for a septic prepatellar bursa approximately 1 week ago.  The patient had

some initial improvement with respect to the cellulitic skin response, but is still

having a sensation of fullness around the prepatellar bursa with persistent soft

tissue swelling.  The patient has had a wound VAC in place.  After discussion with

the patient including risks and benefits, we decided to proceed with a repeat

irrigation and debridement procedure of this left prepatellar bursa.  Informed

consent has been obtained, I believe all questions have been answered. 



DESCRIPTION OF PROCEDURE:  The patient was brought to the operating room and a

time-out performed followed by induction of general anesthesia.  The patient was

then positioned supine on the OR table and a sterile prep and drape was performed of

the left lower extremity after the wound VAC was removed.  The patient was found to

have 2 small segments of white foam from the wound VAC.  They were just tucked under

the skin edges medially and laterally, but really did not advance into the gutters

of the prepatellar bursa.  Once the wound VAC was removed and the sterile prep and

drape performed, the previous skin incision was increased in length proximally and

distally and then a finger was used to sweep the entire prepatellar bursa.  There

was found to be a pocket of purulence remaining in the lateral gutter of the bursa,

that was not being decompressed by the wound VAC.  Following full opening of the

entire prepatellar bursa, curette was used to remove some of the necrotic bursal

lining.  Next, 3 L of normal saline using Pulsavac was irrigated thoroughly through

the prepatellar bursa.  At the completion of this, a Kerlix roll soaked in saline

was packed in the entire bursa followed by a gauze and Ace wrap dressing.  The

patient was then transferred to recovery room in stable condition.  There were no

complications.  He tolerated the procedure well. 







Job ID:  [id number]

## 2019-10-25 ENCOUNTER — HOSPITAL ENCOUNTER (OUTPATIENT)
Dept: HOSPITAL 92 - WCC | Age: 34
Discharge: HOME | End: 2019-10-25
Attending: FAMILY MEDICINE
Payer: COMMERCIAL

## 2019-10-25 DIAGNOSIS — T81.89XD: Primary | ICD-10-CM

## 2019-10-25 PROCEDURE — A4218 STERILE SALINE OR WATER: HCPCS

## 2019-10-25 PROCEDURE — 97605 NEG PRS WND THER DME<=50SQCM: CPT

## 2019-10-29 ENCOUNTER — HOSPITAL ENCOUNTER (OUTPATIENT)
Dept: HOSPITAL 92 - WCC | Age: 34
Discharge: HOME | End: 2019-10-29
Attending: FAMILY MEDICINE
Payer: COMMERCIAL

## 2019-10-29 DIAGNOSIS — T81.89XD: Primary | ICD-10-CM

## 2019-10-29 PROCEDURE — A4218 STERILE SALINE OR WATER: HCPCS

## 2019-11-01 ENCOUNTER — HOSPITAL ENCOUNTER (OUTPATIENT)
Dept: HOSPITAL 92 - WCC | Age: 34
Discharge: HOME | End: 2019-11-01
Attending: FAMILY MEDICINE
Payer: COMMERCIAL

## 2019-11-01 DIAGNOSIS — T81.89XD: Primary | ICD-10-CM

## 2019-11-01 PROCEDURE — A4218 STERILE SALINE OR WATER: HCPCS

## 2019-11-01 PROCEDURE — 97605 NEG PRS WND THER DME<=50SQCM: CPT

## 2019-11-04 ENCOUNTER — HOSPITAL ENCOUNTER (OUTPATIENT)
Dept: HOSPITAL 92 - WCC | Age: 34
Discharge: HOME | End: 2019-11-04
Attending: FAMILY MEDICINE
Payer: COMMERCIAL

## 2019-11-04 DIAGNOSIS — T81.89XD: Primary | ICD-10-CM

## 2019-11-04 NOTE — HP
HISTORY OF PRESENT ILLNESS:  Mr. Edwin Hull is a very pleasant 33-year-old

gentleman, who presents to [facility] for evaluation of a wound of the left

knee subsequent to repeat irrigation and debridement of the left prepatellar bursa

on 10/19/2019.  Previously, the patient had undergone irrigation and debridement of

the left prepatellar bursa on 10/13/2019.  The patient is presently receiving

negative pressure therapy with dressing changes of the wound VAC here in the Wound

Center.  The patient states he has a followup appointment with Orthopedic Surgery in

2 days.  The patient has no complaints today.  He denies any fever or chills. 



PAST MEDICAL HISTORY:  Negative for any chronic medical conditions including

diabetes mellitus, hypertension, or coronary artery disease. 



PAST SURGICAL HISTORY:  

1. Left ankle surgery in 2006 after football injury in [place], [place].

2. Surgery for hydrocele.

3. Tonsillectomy.

4. Irrigation and debridement of left septic prepatellar bursa, 10/13/2019.

5. Repeat irrigation and debridement of left prepatellar bursa, 10/19/2019.



MEDICATIONS:  

1. Sertraline.

2. Doxycycline.

3. Rifampin.

4. Hydrocodone.

5. Tramadol.



ALLERGIES:  NO KNOWN DIAGNOSED ALLERGIES.



SOCIAL HISTORY:  Negative for tobacco or EtOH use.



FAMILY HISTORY:  Negative for diabetes mellitus or coronary artery disease.



PHYSICAL EXAMINATION:

VITAL SIGNS:  Temperature 97.8, pulse 88, respirations 19, and blood pressure

142/83. 

GENERAL:  A 33-year-old gentleman sitting on table in examination room, in no acute

distress. 

HEENT:  Normocephalic, atraumatic. 

NECK:  No nuchal rigidity. 

CHEST:  Clear to auscultation. 

CARDIOVASCULAR:  Regular rate and rhythm. 

ABDOMEN:  Soft. 

EXTREMITIES:  A wound of the left knee is present, which measures approximately 5.0

x 4.0 cm.  Granulation tissue is present within the wound margins.  No purulent

drainage is associated with the wound.  No erythema of the skin surrounding the

wound is present.  No maceration of the skin of the periwound is noted. 

NEUROLOGIC:  Grossly nonfocal.



ASSESSMENT AND PLAN:  Wound of left knee as described above.  Negative pressure

therapy will be continued with dressing changes of the wound VAC here in the Wound

Center.  The patient states he has a followup appointment with Orthopedic Surgery in

2 days.  I will see Mr. Hull again in 1 week.  The patient understands and is in

agreement with the preceding treatment plan.  The patient states he will continue

doxycycline and rifampin as prescribed by Infectious Diseases.  The patient was seen

in consultation by Infectious Diseases during his hospital stay. 







Job ID:  [id number]

## 2020-09-10 ENCOUNTER — HOSPITAL ENCOUNTER (EMERGENCY)
Dept: HOSPITAL 92 - ERS | Age: 35
Discharge: HOME | End: 2020-09-10
Payer: COMMERCIAL

## 2020-09-10 DIAGNOSIS — F41.9: ICD-10-CM

## 2020-09-10 DIAGNOSIS — F32.9: ICD-10-CM
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
## 2020-09-10 DIAGNOSIS — S51.811A: Primary | ICD-10-CM

## 2020-09-10 DIAGNOSIS — Z79.899: ICD-10-CM

## 2020-09-10 DIAGNOSIS — I10: ICD-10-CM

## 2020-09-10 DIAGNOSIS — F43.10: ICD-10-CM

## 2020-09-10 PROCEDURE — 12002 RPR S/N/AX/GEN/TRNK2.6-7.5CM: CPT

## 2020-11-05 NOTE — HP
REASON FOR ADMISSION:  Snake bite.



HISTORY OF PRESENT ILLNESS:  This is a 34-year-old male patient who was bit by a

snake more than twice maybe three times tonight, subsequently started having

swelling of his left foot, also did report pain and numbness.  He thinks that the

snake was a copperhead.  Upon his arrival to the ER, he received CroFab and

currently the swelling has been slowed significantly, but still some is occurring

mainly in the lateral side of his leg.  He will receive a second dose of CroFab. 



PAST MEDICAL HISTORY:  The patient reports past medical history of,

1. High blood pressure.

2. Anxiety.

3. Depression.

4. PTSD.



PAST SURGICAL HISTORY:  

1. Fibular fracture.

2. Tonsillectomy.



SOCIAL HISTORY:  He does not smoke.  Does not drink alcohol.



REVIEW OF SYSTEMS:  All systems reviewed except the above mentioned, found to be

negative. 



FAMILY HISTORY:  Negative for premature coronary artery disease.



PHYSICAL EXAMINATION:

GENERAL:  He is awake, alert, oriented, does not appear in distress. 

VITAL SIGNS:  His blood pressure is 117/64, heart rate of 61, temperature is 98.1,

saturating 100% on room air. 

HEENT:  Head is nontraumatic, normocephalic.  Pupils equally reactive.  Extraocular

movements are intact.  Nonicteric sclerae.  Well injected conjunctivae.  Oral mucosa

normal.  Nasal mucosa normal. 

NECK:  Supple.  No adenopathy.  No murmur.  Thyroid is not palpable.  Trachea is

midline.  No supraclavicular adenopathy. 

HEART:  S1, S2 regular.  No murmur.  No gallops.  No friction rubs.  No displacement

of PMI. 

LUNGS:  Clear to auscultation bilaterally.  No wheezes, rhonchi, or crackles. 

ABDOMEN:  Bowel sounds are positive.  Nontender abdomen.  No hepatosplenomegaly. 

EXTREMITIES:  He does have left lower extremity swelling extending from the foot up

to his above the ankle area and some erythema and some decreased sensation around

that area. 

NEURO:  Cranial nerves II through XII within normal limits.  Normal motor function.

Normal sensory function.  Normal reflexes. 



LABORATORY DATA:  Blood work shows WBC of 14, hemoglobin 15.1, platelets of 251.

Initial coagulation studies shows INR 0.9, PTT of 29.7, sodium of 140, potassium

3.6, BUN 10, creatinine 0.89.  CK of 214. 



ASSESSMENT/PLAN:  This is a 34-year-old male patient presenting after having

multiple snake bites to his left foot.  The snake was confirmed to be copperhead.

He did receive CroFab as per our protocol, will receive a 2nd dose.  We will

continue to monitor his blood work as per our protocol.  We will provide him with

pain control and Benadryl on as needed basis. 







Job ID:  [id number]

## 2020-11-06 NOTE — DIS
DATE OF ADMISSION:  11/04/2020



DATE OF DISCHARGE:  11/05/2020



PRIMARY CARE PHYSICIAN:  Dr. Burton.



REASON FOR ADMISSION:  Snake bite.



DIAGNOSES AT DISCHARGE:  

1. Copperhead snake bite with envenomation.

2. Hypertension.



PROCEDURES:  None.



CONSULTATIONS:  None.



SUMMARY OF HOSPITAL COURSE:  This is a 34-year-old white male with a history of

hypertension, who was bitten by a copperhead snake on his left foot multiple times,

had pain and numbness to the area.  In the ER, he was given CroFab due to swelling

and redness.  He had progression of the swelling into the lateral side of his left

leg, passed his ankle onto the leg and so he was given a 2nd dose of CroFab early

this morning around 2:00 a.m.  Since then, the patient has had decreased swelling

and redness of the leg, is  to step on, but has markedly improved than

when he came in.  We have watched him over the course of the day and has seen

continued marked improvement and so he is being discharged home.  I did examine him

before discharge.  His cardiovascular exam and pulmonary exam were normal.  He had

good pulses into the foot.  Good cap refill.  He did have a little bit of bruising

over the distal top of the foot that is stable from this morning and the swelling

has improved.  He has good sensation to the tips of the toes as well and is able to

ambulate on it.  There was some discomfort due to the swelling. 



DISCHARGE MANAGEMENT:  Discharged home.



ACTIVITY:  As tolerated.  He is to stay off work until the swelling goes down enough

that he can fit a normal shoe on, in likely the next 3 to 7 days. 



DIET:  Regular diet.



FOLLOWUP:  Follow up with Dr. Burton in 7 days if not resolved.



DISCHARGE MEDICATIONS:  Continue sertraline 50 mg daily.







Job ID:  [id number]

## 2022-10-06 ENCOUNTER — HOSPITAL ENCOUNTER (EMERGENCY)
Dept: HOSPITAL 92 - ERS | Age: 37
Discharge: HOME | End: 2022-10-06
Payer: COMMERCIAL

## 2022-10-06 DIAGNOSIS — I10: ICD-10-CM

## 2022-10-06 DIAGNOSIS — Z00.00: Primary | ICD-10-CM

## 2022-10-06 DIAGNOSIS — Z79.899: ICD-10-CM

## 2022-10-06 LAB
ALBUMIN SERPL BCG-MCNC: 4.4 G/DL (ref 3.5–5)
ALP SERPL-CCNC: 98 U/L (ref 40–110)
ALT SERPL W P-5'-P-CCNC: 27 U/L (ref 8–55)
ANION GAP SERPL CALC-SCNC: 12 MMOL/L (ref 10–20)
AST SERPL-CCNC: 20 U/L (ref 5–34)
BASOPHILS # BLD AUTO: 0.1 THOU/UL (ref 0–0.2)
BASOPHILS NFR BLD AUTO: 0.7 % (ref 0–1)
BILIRUB SERPL-MCNC: 0.5 MG/DL (ref 0.2–1.2)
BUN SERPL-MCNC: 10 MG/DL (ref 8.9–20.6)
CALCIUM SERPL-MCNC: 9.3 MG/DL (ref 7.8–10.44)
CHLORIDE SERPL-SCNC: 105 MMOL/L (ref 98–107)
CO2 SERPL-SCNC: 26 MMOL/L (ref 22–29)
CREAT CL PREDICTED SERPL C-G-VRATE: 0 ML/MIN (ref 70–130)
EOSINOPHIL # BLD AUTO: 0.5 THOU/UL (ref 0–0.7)
EOSINOPHIL NFR BLD AUTO: 4.9 % (ref 0–10)
GLOBULIN SER CALC-MCNC: 3.3 G/DL (ref 2.4–3.5)
GLUCOSE SERPL-MCNC: 87 MG/DL (ref 70–105)
HGB BLD-MCNC: 16.1 G/DL (ref 14–18)
LYMPHOCYTES # BLD: 2.1 THOU/UL (ref 1.2–3.4)
LYMPHOCYTES NFR BLD AUTO: 21.1 % (ref 21–51)
MCH RBC QN AUTO: 28.9 PG (ref 27–31)
MCV RBC AUTO: 86.6 FL (ref 78–98)
MONOCYTES # BLD AUTO: 1 THOU/UL (ref 0.11–0.59)
MONOCYTES NFR BLD AUTO: 10.6 % (ref 0–10)
NEUTROPHILS # BLD AUTO: 6.1 THOU/UL (ref 1.4–6.5)
NEUTROPHILS NFR BLD AUTO: 62.7 % (ref 42–75)
PLATELET # BLD AUTO: 248 THOU/UL (ref 130–400)
POTASSIUM SERPL-SCNC: 3.9 MMOL/L (ref 3.5–5.1)
RBC # BLD AUTO: 5.55 MILL/UL (ref 4.7–6.1)
SODIUM SERPL-SCNC: 139 MMOL/L (ref 136–145)
WBC # BLD AUTO: 9.8 THOU/UL (ref 4.8–10.8)

## 2022-10-06 PROCEDURE — 80053 COMPREHEN METABOLIC PANEL: CPT

## 2022-10-06 PROCEDURE — 80048 BASIC METABOLIC PNL TOTAL CA: CPT

## 2022-10-06 PROCEDURE — 85025 COMPLETE CBC W/AUTO DIFF WBC: CPT

## 2022-10-06 PROCEDURE — 93005 ELECTROCARDIOGRAM TRACING: CPT

## 2023-04-22 ENCOUNTER — HOSPITAL ENCOUNTER (EMERGENCY)
Dept: HOSPITAL 92 - ERS | Age: 38
Discharge: HOME | End: 2023-04-22
Payer: COMMERCIAL

## 2023-04-22 DIAGNOSIS — R19.7: Primary | ICD-10-CM

## 2023-04-22 DIAGNOSIS — I10: ICD-10-CM

## 2023-04-22 DIAGNOSIS — Z79.899: ICD-10-CM

## 2023-04-22 LAB
ALBUMIN SERPL BCG-MCNC: 4.4 G/DL (ref 3.5–5)
ALP SERPL-CCNC: 99 U/L (ref 40–110)
ALT SERPL W P-5'-P-CCNC: 28 U/L (ref 8–55)
ANION GAP SERPL CALC-SCNC: 12 MMOL/L (ref 10–20)
AST SERPL-CCNC: 24 U/L (ref 5–34)
BASOPHILS # BLD AUTO: 0 THOU/UL (ref 0–0.2)
BASOPHILS NFR BLD AUTO: 0.3 % (ref 0–1)
BILIRUB SERPL-MCNC: 0.6 MG/DL (ref 0.2–1.2)
BUN SERPL-MCNC: 9 MG/DL (ref 8.9–20.6)
CALCIUM SERPL-MCNC: 9.4 MG/DL (ref 7.8–10.44)
CHLORIDE SERPL-SCNC: 105 MMOL/L (ref 98–107)
CO2 SERPL-SCNC: 24 MMOL/L (ref 22–29)
CREAT CL PREDICTED SERPL C-G-VRATE: 0 ML/MIN (ref 70–130)
EOSINOPHIL # BLD AUTO: 0.1 THOU/UL (ref 0–0.7)
EOSINOPHIL NFR BLD AUTO: 1.8 % (ref 0–10)
GLOBULIN SER CALC-MCNC: 3.2 G/DL (ref 2.4–3.5)
GLUCOSE SERPL-MCNC: 90 MG/DL (ref 70–105)
HGB BLD-MCNC: 15.4 G/DL (ref 14–18)
LYMPHOCYTES # BLD: 1 THOU/UL (ref 1.2–3.4)
LYMPHOCYTES NFR BLD AUTO: 14.4 % (ref 21–51)
MCH RBC QN AUTO: 30.4 PG (ref 27–31)
MCV RBC AUTO: 86.7 FL (ref 78–98)
MONOCYTES # BLD AUTO: 1 THOU/UL (ref 0.11–0.59)
MONOCYTES NFR BLD AUTO: 13.7 % (ref 0–10)
NEUTROPHILS # BLD AUTO: 4.9 THOU/UL (ref 1.4–6.5)
NEUTROPHILS NFR BLD AUTO: 69.8 % (ref 42–75)
PLATELET # BLD AUTO: 186 10X3/UL (ref 130–400)
POTASSIUM SERPL-SCNC: 3.8 MMOL/L (ref 3.5–5.1)
RBC # BLD AUTO: 5.09 MILL/UL (ref 4.7–6.1)
SODIUM SERPL-SCNC: 137 MMOL/L (ref 136–145)
WBC # BLD AUTO: 7 10X3/UL (ref 4.8–10.8)

## 2023-04-22 PROCEDURE — 96372 THER/PROPH/DIAG INJ SC/IM: CPT

## 2023-04-22 PROCEDURE — 80053 COMPREHEN METABOLIC PANEL: CPT

## 2023-04-22 PROCEDURE — 36415 COLL VENOUS BLD VENIPUNCTURE: CPT

## 2023-04-22 PROCEDURE — 85025 COMPLETE CBC W/AUTO DIFF WBC: CPT

## 2023-04-22 PROCEDURE — 99284 EMERGENCY DEPT VISIT MOD MDM: CPT

## 2024-12-05 ENCOUNTER — HOSPITAL ENCOUNTER (EMERGENCY)
Dept: HOSPITAL 92 - ERS | Age: 39
LOS: 1 days | Discharge: HOME | End: 2024-12-06
Payer: COMMERCIAL

## 2024-12-05 DIAGNOSIS — Z79.899: ICD-10-CM

## 2024-12-05 DIAGNOSIS — I10: ICD-10-CM

## 2024-12-05 DIAGNOSIS — R51.9: Primary | ICD-10-CM

## 2024-12-05 LAB
ALBUMIN SERPL BCG-MCNC: 3.8 G/DL (ref 3.5–5)
ALP SERPL-CCNC: 116 U/L (ref 40–110)
ALT SERPL W P-5'-P-CCNC: 63 U/L (ref 8–55)
ANION GAP SERPL CALC-SCNC: 15 MMOL/L (ref 10–20)
APAP SERPL-MCNC: (no result) MCG/ML (ref ?–10)
APTT PPP: 27 SEC (ref 22.9–36.1)
AST SERPL-CCNC: 33 U/L (ref 5–34)
BASOPHILS # BLD AUTO: 0.03 10X3/UL (ref 0–0.2)
BASOPHILS NFR BLD AUTO: 0.4 % (ref 0–1)
BILIRUB SERPL-MCNC: 0.3 MG/DL (ref 0.2–1.2)
BUN SERPL-MCNC: 8 MG/DL (ref 8.9–20.6)
CALCIUM SERPL-MCNC: 8.7 MG/DL (ref 7.8–10.44)
CHLORIDE SERPL-SCNC: 107 MMOL/L (ref 98–107)
CO2 SERPL-SCNC: 23 MMOL/L (ref 22–29)
CREAT CL PREDICTED SERPL C-G-VRATE: 0 ML/MIN (ref 70–130)
EOSINOPHIL # BLD AUTO: 0 10X3/UL (ref 0–0.7)
EOSINOPHIL NFR BLD AUTO: 0.5 % (ref 0–10)
GLOBULIN SER CALC-MCNC: 3 G/DL (ref 2.4–3.5)
GLUCOSE SERPL-MCNC: 103 MG/DL (ref 70–105)
HCT VFR BLD CALC: 43.9 % (ref 42–52)
HGB BLD-MCNC: 14.7 G/DL (ref 14–18)
INR PPP: 1
LYMPHOCYTES NFR BLD AUTO: 12 % (ref 21–51)
MAGNESIUM SERPL-MCNC: 1.8 MG/DL (ref 1.6–2.6)
MCH RBC QN AUTO: 27.8 PG (ref 27–31)
MCV RBC AUTO: 83 FL (ref 78–98)
MONOCYTES # BLD AUTO: 0.5 10X3/UL (ref 0.11–0.59)
MONOCYTES NFR BLD AUTO: 5.8 % (ref 0–10)
NEUTROPHILS # BLD AUTO: 6.8 10X3/UL (ref 1.4–6.5)
NEUTROPHILS NFR BLD AUTO: 81.1 % (ref 42–75)
PLATELET # BLD AUTO: 259 10X3/UL (ref 130–400)
POTASSIUM SERPL-SCNC: 3.8 MMOL/L (ref 3.5–5.1)
PROTHROMBIN TIME: 13.2 SEC (ref 12–14.7)
RBC # BLD AUTO: 5.29 MILL/UL (ref 4.7–6.1)
SALICYLATES SERPL-MCNC: (no result) MG/DL (ref ?–8)
SODIUM SERPL-SCNC: 141 MMOL/L (ref 136–145)
WBC # BLD AUTO: 8.3 10X3/UL (ref 4.8–10.8)

## 2024-12-05 PROCEDURE — 80053 COMPREHEN METABOLIC PANEL: CPT

## 2024-12-05 PROCEDURE — 80307 DRUG TEST PRSMV CHEM ANLYZR: CPT

## 2024-12-05 PROCEDURE — 83735 ASSAY OF MAGNESIUM: CPT

## 2024-12-05 PROCEDURE — 85025 COMPLETE CBC W/AUTO DIFF WBC: CPT

## 2024-12-05 PROCEDURE — 96374 THER/PROPH/DIAG INJ IV PUSH: CPT

## 2024-12-05 PROCEDURE — 85730 THROMBOPLASTIN TIME PARTIAL: CPT

## 2024-12-05 PROCEDURE — 87428 SARSCOV & INF VIR A&B AG IA: CPT

## 2024-12-05 PROCEDURE — 96375 TX/PRO/DX INJ NEW DRUG ADDON: CPT

## 2024-12-05 PROCEDURE — 85610 PROTHROMBIN TIME: CPT

## 2024-12-05 PROCEDURE — 36415 COLL VENOUS BLD VENIPUNCTURE: CPT

## 2024-12-05 PROCEDURE — 96361 HYDRATE IV INFUSION ADD-ON: CPT

## 2024-12-05 PROCEDURE — 70450 CT HEAD/BRAIN W/O DYE: CPT
